# Patient Record
Sex: MALE | Race: WHITE
[De-identification: names, ages, dates, MRNs, and addresses within clinical notes are randomized per-mention and may not be internally consistent; named-entity substitution may affect disease eponyms.]

---

## 2020-07-28 ENCOUNTER — HOSPITAL ENCOUNTER (EMERGENCY)
Dept: HOSPITAL 62 - ER | Age: 40
Discharge: HOME | End: 2020-07-28
Payer: MEDICARE

## 2020-07-28 VITALS — SYSTOLIC BLOOD PRESSURE: 123 MMHG | DIASTOLIC BLOOD PRESSURE: 88 MMHG

## 2020-07-28 DIAGNOSIS — F90.9: ICD-10-CM

## 2020-07-28 DIAGNOSIS — Z04.6: Primary | ICD-10-CM

## 2020-07-28 DIAGNOSIS — Z87.891: ICD-10-CM

## 2020-07-28 DIAGNOSIS — F13.20: ICD-10-CM

## 2020-07-28 DIAGNOSIS — F31.9: ICD-10-CM

## 2020-07-28 DIAGNOSIS — F10.129: ICD-10-CM

## 2020-07-28 DIAGNOSIS — Z79.899: ICD-10-CM

## 2020-07-28 DIAGNOSIS — Z63.8: ICD-10-CM

## 2020-07-28 DIAGNOSIS — F43.10: ICD-10-CM

## 2020-07-28 DIAGNOSIS — T42.4X2A: ICD-10-CM
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
## 2020-07-28 DIAGNOSIS — F12.10: ICD-10-CM

## 2020-07-28 DIAGNOSIS — R09.02: ICD-10-CM

## 2020-07-28 LAB
ADD MANUAL DIFF: NO
ALBUMIN SERPL-MCNC: 4.7 G/DL (ref 3.5–5)
ALP SERPL-CCNC: 88 U/L (ref 38–126)
ANION GAP SERPL CALC-SCNC: 12 MMOL/L (ref 5–19)
APAP SERPL-MCNC: < 10 UG/ML (ref 10–30)
APPEARANCE UR: CLEAR
APTT PPP: YELLOW S
AST SERPL-CCNC: 28 U/L (ref 17–59)
BARBITURATES UR QL SCN: NEGATIVE
BASOPHILS # BLD AUTO: 0.1 10^3/UL (ref 0–0.2)
BASOPHILS NFR BLD AUTO: 0.4 % (ref 0–2)
BILIRUB DIRECT SERPL-MCNC: 0 MG/DL (ref 0–0.4)
BILIRUB SERPL-MCNC: 0.3 MG/DL (ref 0.2–1.3)
BILIRUB UR QL STRIP: NEGATIVE
BUN SERPL-MCNC: 9 MG/DL (ref 7–20)
CALCIUM: 9.7 MG/DL (ref 8.4–10.2)
CHLORIDE SERPL-SCNC: 108 MMOL/L (ref 98–107)
CO2 SERPL-SCNC: 22 MMOL/L (ref 22–30)
EOSINOPHIL # BLD AUTO: 0.1 10^3/UL (ref 0–0.6)
EOSINOPHIL NFR BLD AUTO: 0.4 % (ref 0–6)
ERYTHROCYTE [DISTWIDTH] IN BLOOD BY AUTOMATED COUNT: 14.6 % (ref 11.5–14)
ETHANOL SERPL-MCNC: 146 MG/DL
GLUCOSE SERPL-MCNC: 112 MG/DL (ref 75–110)
GLUCOSE UR STRIP-MCNC: NEGATIVE MG/DL
HCT VFR BLD CALC: 46.2 % (ref 37.9–51)
HGB BLD-MCNC: 15.6 G/DL (ref 13.5–17)
KETONES UR STRIP-MCNC: NEGATIVE MG/DL
LYMPHOCYTES # BLD AUTO: 3.8 10^3/UL (ref 0.5–4.7)
LYMPHOCYTES NFR BLD AUTO: 29.2 % (ref 13–45)
MCH RBC QN AUTO: 30.4 PG (ref 27–33.4)
MCHC RBC AUTO-ENTMCNC: 33.8 G/DL (ref 32–36)
MCV RBC AUTO: 90 FL (ref 80–97)
METHADONE UR QL SCN: NEGATIVE
MONOCYTES # BLD AUTO: 0.7 10^3/UL (ref 0.1–1.4)
MONOCYTES NFR BLD AUTO: 5.6 % (ref 3–13)
NEUTROPHILS # BLD AUTO: 8.4 10^3/UL (ref 1.7–8.2)
NEUTS SEG NFR BLD AUTO: 64.4 % (ref 42–78)
NITRITE UR QL STRIP: NEGATIVE
PCP UR QL SCN: NEGATIVE
PH UR STRIP: 5 [PH] (ref 5–9)
PLATELET # BLD: 347 10^3/UL (ref 150–450)
POTASSIUM SERPL-SCNC: 4.1 MMOL/L (ref 3.6–5)
PROT SERPL-MCNC: 7.8 G/DL (ref 6.3–8.2)
PROT UR STRIP-MCNC: NEGATIVE MG/DL
RBC # BLD AUTO: 5.13 10^6/UL (ref 4.35–5.55)
SALICYLATES SERPL-MCNC: < 1 MG/DL (ref 2–20)
SP GR UR STRIP: 1.01
TOTAL CELLS COUNTED % (AUTO): 100 %
URINE AMPHETAMINES SCREEN: NEGATIVE
URINE BENZODIAZEPINES SCREEN: NEGATIVE
URINE COCAINE SCREEN: NEGATIVE
URINE MARIJUANA (THC) SCREEN: (no result)
UROBILINOGEN UR-MCNC: NEGATIVE MG/DL (ref ?–2)
WBC # BLD AUTO: 13 10^3/UL (ref 4–10.5)

## 2020-07-28 PROCEDURE — 85025 COMPLETE CBC W/AUTO DIFF WBC: CPT

## 2020-07-28 PROCEDURE — 93005 ELECTROCARDIOGRAM TRACING: CPT

## 2020-07-28 PROCEDURE — 96361 HYDRATE IV INFUSION ADD-ON: CPT

## 2020-07-28 PROCEDURE — 80053 COMPREHEN METABOLIC PANEL: CPT

## 2020-07-28 PROCEDURE — 96360 HYDRATION IV INFUSION INIT: CPT

## 2020-07-28 PROCEDURE — 93010 ELECTROCARDIOGRAM REPORT: CPT

## 2020-07-28 PROCEDURE — 81001 URINALYSIS AUTO W/SCOPE: CPT

## 2020-07-28 PROCEDURE — 99285 EMERGENCY DEPT VISIT HI MDM: CPT

## 2020-07-28 PROCEDURE — 80307 DRUG TEST PRSMV CHEM ANLYZR: CPT

## 2020-07-28 PROCEDURE — 36415 COLL VENOUS BLD VENIPUNCTURE: CPT

## 2020-07-28 SDOH — SOCIAL STABILITY - SOCIAL INSECURITY: OTHER SPECIFIED PROBLEMS RELATED TO PRIMARY SUPPORT GROUP: Z63.8

## 2020-07-28 NOTE — ER DOCUMENT REPORT
ED Psych Disorder / Suicide





- General


Stated Complaint: POSSIBLE OVERDOSE


Time Seen by Provider: 07/28/20 02:00


Notes: 


Patient is a 40-year-old male who comes emergency department for chief complaint

of benzodiazepine overdose.  Per EMS patient took 140 mg of Ativan tonight, they

also state that patient daily is on a total of 30 mg of Ativan prescribed.  I 

asked this again and nursing confirmed this amount.  Patient also states that he

drank 4 drinks of Chavez with coke.  Patient states he "just wanted to go to 

sleep".  When I asked if he wanted to kill himself or if he had overdosed 

previously, he states that he overdosed in the past and he does not want to 

answer his reason for doing this.  EMS states that his wife called them and when

they showed up and where there for him patient became very angry and brandished 

multiple knives before they talked him down and he put them away.  He denies any

other medications tonight.  He is on Abilify, his ADHD medication which is 

unnamed, and fluoxetine.  He denies recreational drugs.





- Related Data


Allergies/Adverse Reactions: 


                                        





No Known Allergies Allergy (Unverified 04/11/14 13:21)


   











Past Medical History





- General


Information source: Patient





- Social History


Smoking Status: Former Smoker


Frequency of alcohol use: Social


Drug Abuse: Marijuana


Lives with: Family


Family History: Reviewed & Not Pertinent


Musculoskeletal Medical History: Denies Hx Gout - denies hx of gout


Psychiatric Medical History: Reports: Hx Attention Deficit Hyperactivity 

Disorder, Hx Bipolar Disorder, Hx Post Traumatic Stress Disorder


Past Surgical History: Reports: Hx Orthopedic Surgery - right ACL repair





- Immunizations


Hx Diphtheria, Pertussis, Tetanus Vaccination: Yes





Review of Systems





- Review of Systems


Constitutional: No symptoms reported


EENT: No symptoms reported


Cardiovascular: No symptoms reported


Respiratory: No symptoms reported


Gastrointestinal: No symptoms reported


Genitourinary: No symptoms reported


Male Genitourinary: No symptoms reported


Musculoskeletal: No symptoms reported


Skin: No symptoms reported


Hematologic/Lymphatic: No symptoms reported


Neurological/Psychological: See HPI





Physical Exam





- Vital signs


Vitals: 


                                        











Resp BP Pulse Ox


 


 8 L  125/100 H  95 


 


 07/28/20 01:53  07/28/20 01:53  07/28/20 01:53














- Notes


Notes: 





GENERAL: Drowsy, sleeping but easily aroused.  Slightly disheveled


HEAD: Normocephalic, atraumatic.


EYES: Pupils equal, round, and reactive to light. Extraocular movements intact.


ENT: Oral mucosa moist, tongue midline. Oropharynx unremarkable. Airway patent. 


NECK: Full range of motion. Supple. Trachea midline. No lymphadenopathy.


LUNGS: Clear to auscultation bilaterally, no wheezes, rales, or rhonchi. No 

respiratory distress. Non-tender chest wall. 


HEART: Regular rate and rhythm. No murmur


ABDOMEN: Soft, non-tender. Non-distended. 


EXTREMITIES: Moves all 4 extremities spontaneously. No edema, normal radial and 

dorsalis pedis pulses bilaterally. No cyanosis.


BACK: no cervical, thoracic, lumbar midline tenderness. No saddle anesthesia, 

normal distal neurovascular exam. Moves all extremities in full range of motion.


NEUROLOGICAL: Drowsy but easily aroused and oriented to person, place, events.  

Normal speech. Cranial nerves II through XII grossly intact. Strength 5/5 in all

extremities. 


PSYCH: Irritable


SKIN: Warm, dry, normal turgor. No rashes or lesions noted.





Course





- Re-evaluation


Re-evalutation: 


Initially patient is groggy, when he fell asleep he became mildly hypoxic at 

approximately 92% on room air with respiratory rate of 6, I evaluated him and he

aroused, his physical examination is otherwise unremarkable, he is easily 

aroused, oriented to person, place, events, has no complaints on my evaluation. 

After this he did not have apnea when he rested.





I called and spoke with poison control, the recommendation is that patient not 

be given Romazicon because he could have a seizure with his benzodiazepine 

dependence, they recommend supportive therapy such as IV fluids and oxygen, 

monitoring for 6 hours, then patient can be medically cleared.  No additional 

advice at this time other than basic laboratory work-up and EKG.





Patient became hostile, stated he was going to "fight you guys to get out".  I 

explained that patient was here on 24-hour hold because of his intentional 

overdose and violent behavior towards EMS, this paperwork has been signed by Dr. Orozco.  After this patient did calm down, he stated that he wanted to be 

cooperative, he agreed to laboratory work-up and monitoring.





Based on patient getting 90 1 mg Ativan tablets with each prescription I think 

patient, EMS, and significant other are over reporting the amount the patient 

took.





07/28/20 05:01


Patient is sleeping but is maintaining his airway well, heart rate is 79, 

respiratory rate is 20, oxygen saturation 95%, blood pressure 142/96.  CBC, 

chemistry, EKG unremarkable, alcohol is 146, urine is still pending.





07/28/20 05:50


Urinalysis and urine drug screen shows marijuana only, unremarkable otherwise.  

Patient will be medically cleared in approximately 2 hours, patient is pending 

mental health evaluation, IVC forms have been signed by Dr. Orozco.








- Vital Signs


Vital signs: 


                                        











Temp Pulse Resp BP Pulse Ox


 


 98.8 F      21 H  142/97 H  96 


 


 07/28/20 02:10     07/28/20 05:01  07/28/20 05:00  07/28/20 05:01














- Laboratory


Result Diagrams: 


                                 07/28/20 02:10





                                 07/28/20 02:10


Laboratory results interpreted by me: 


                                        











  07/28/20 07/28/20 07/28/20





  02:10 02:10 02:45


 


WBC  13.0 H  


 


RDW  14.6 H  


 


Absolute Neuts (auto)  8.4 H  


 


Chloride   108 H 


 


Glucose   112 H 


 


Urine Blood    SMALL H


 


Salicylates   < 1.0 L 


 


Acetaminophen   < 10 L 














- EKG Interpretation by Me


Additional EKG results interpreted by me: 


EKG shows sinus rhythm at a rate of 83, QTC of 442, normal axis, no T wave 

inversions or ST segment changes in consecutive leads





Discharge





- Discharge


Clinical Impression: 


 Aggressive behavior





Intentional benzodiazepine overdose


Qualifiers:


 Encounter type: initial encounter Qualified Code(s): T42.4X2A - Poisoning by 

benzodiazepines, intentional self-harm, initial encounter





Condition: Stable


Disposition: PSYCH HOSP/UNIT